# Patient Record
Sex: MALE | Race: BLACK OR AFRICAN AMERICAN | Employment: FULL TIME | URBAN - METROPOLITAN AREA
[De-identification: names, ages, dates, MRNs, and addresses within clinical notes are randomized per-mention and may not be internally consistent; named-entity substitution may affect disease eponyms.]

---

## 2017-05-26 ENCOUNTER — APPOINTMENT (OUTPATIENT)
Dept: GENERAL RADIOLOGY | Age: 37
End: 2017-05-26

## 2017-05-26 ENCOUNTER — HOSPITAL ENCOUNTER (EMERGENCY)
Age: 37
Discharge: HOME | End: 2017-05-26
Attending: EMERGENCY MEDICINE

## 2017-05-26 VITALS
TEMPERATURE: 97.2 F | SYSTOLIC BLOOD PRESSURE: 146 MMHG | DIASTOLIC BLOOD PRESSURE: 96 MMHG | WEIGHT: 145 LBS | HEART RATE: 79 BPM | OXYGEN SATURATION: 98 % | BODY MASS INDEX: 22.76 KG/M2 | RESPIRATION RATE: 18 BRPM | HEIGHT: 67 IN

## 2017-05-26 DIAGNOSIS — S09.90XA CLOSED HEAD INJURY, INITIAL ENCOUNTER: ICD-10-CM

## 2017-05-26 DIAGNOSIS — S20.219A CONTUSION OF CHEST WALL, UNSPECIFIED LATERALITY, INITIAL ENCOUNTER: ICD-10-CM
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
## 2017-05-26 PROCEDURE — 71020 XR CHEST STANDARD TWO VW: CPT

## 2017-05-26 PROCEDURE — 99284 EMERGENCY DEPT VISIT MOD MDM: CPT

## 2017-05-26 ASSESSMENT — PAIN SCALES - GENERAL: PAINLEVEL_OUTOF10: 7

## 2017-05-26 ASSESSMENT — PAIN DESCRIPTION - LOCATION: LOCATION: HEAD

## 2017-05-26 ASSESSMENT — PAIN DESCRIPTION - PAIN TYPE: TYPE: ACUTE PAIN

## 2017-05-27 ASSESSMENT — ENCOUNTER SYMPTOMS
TROUBLE SWALLOWING: 0
BACK PAIN: 0
NAUSEA: 0
SHORTNESS OF BREATH: 0
RHINORRHEA: 0
DIARRHEA: 0
CONSTIPATION: 0
ABDOMINAL PAIN: 0
VOMITING: 0

## 2019-10-28 ENCOUNTER — OFFICE VISIT (OUTPATIENT)
Dept: FAMILY MEDICINE CLINIC | Age: 39
End: 2019-10-28
Payer: MEDICAID

## 2019-10-28 VITALS
OXYGEN SATURATION: 97 % | HEART RATE: 94 BPM | WEIGHT: 171 LBS | BODY MASS INDEX: 25.91 KG/M2 | TEMPERATURE: 98.1 F | DIASTOLIC BLOOD PRESSURE: 75 MMHG | HEIGHT: 68 IN | RESPIRATION RATE: 22 BRPM | SYSTOLIC BLOOD PRESSURE: 132 MMHG

## 2019-10-28 DIAGNOSIS — L65.9 HAIR LOSS: ICD-10-CM

## 2019-10-28 DIAGNOSIS — L85.3 DRY SKIN: ICD-10-CM

## 2019-10-28 DIAGNOSIS — Z76.89 ESTABLISHING CARE WITH NEW DOCTOR, ENCOUNTER FOR: Primary | ICD-10-CM

## 2019-10-28 PROCEDURE — 99203 OFFICE O/P NEW LOW 30 MIN: CPT | Performed by: FAMILY MEDICINE

## 2019-10-28 SDOH — HEALTH STABILITY: MENTAL HEALTH: HOW OFTEN DO YOU HAVE A DRINK CONTAINING ALCOHOL?: NEVER

## 2019-10-28 ASSESSMENT — PATIENT HEALTH QUESTIONNAIRE - PHQ9
SUM OF ALL RESPONSES TO PHQ9 QUESTIONS 1 & 2: 0
1. LITTLE INTEREST OR PLEASURE IN DOING THINGS: 0
SUM OF ALL RESPONSES TO PHQ QUESTIONS 1-9: 0
2. FEELING DOWN, DEPRESSED OR HOPELESS: 0
SUM OF ALL RESPONSES TO PHQ QUESTIONS 1-9: 0

## 2019-10-29 ENCOUNTER — HOSPITAL ENCOUNTER (OUTPATIENT)
Age: 39
Setting detail: SPECIMEN
Discharge: HOME | End: 2019-10-29

## 2019-10-29 DIAGNOSIS — L85.3 DRY SKIN: ICD-10-CM

## 2019-10-29 DIAGNOSIS — L65.9 HAIR LOSS: ICD-10-CM

## 2019-10-29 DIAGNOSIS — Z76.89 ESTABLISHING CARE WITH NEW DOCTOR, ENCOUNTER FOR: ICD-10-CM

## 2019-10-29 LAB
ABSOLUTE EOS #: 0.09 K/UL (ref 0–0.44)
ABSOLUTE IMMATURE GRANULOCYTE: <0.03 K/UL (ref 0–0.3)
ABSOLUTE LYMPH #: 2.44 K/UL (ref 1.1–3.7)
ABSOLUTE MONO #: 0.38 K/UL (ref 0.1–1.2)
ALBUMIN SERPL-MCNC: 4.6 G/DL (ref 3.5–5.2)
ALBUMIN/GLOBULIN RATIO: 1.2 (ref 1–2.5)
ALP BLD-CCNC: 99 U/L (ref 40–129)
ALT SERPL-CCNC: 77 U/L (ref 5–41)
ANION GAP SERPL CALCULATED.3IONS-SCNC: 16 MMOL/L (ref 9–17)
AST SERPL-CCNC: 49 U/L
BASOPHILS # BLD: 0 % (ref 0–2)
BASOPHILS ABSOLUTE: <0.03 K/UL (ref 0–0.2)
BILIRUB SERPL-MCNC: 1.05 MG/DL (ref 0.3–1.2)
BUN BLDV-MCNC: 12 MG/DL (ref 6–20)
BUN/CREAT BLD: ABNORMAL (ref 9–20)
CALCIUM SERPL-MCNC: 10 MG/DL (ref 8.6–10.4)
CHLORIDE BLD-SCNC: 104 MMOL/L (ref 98–107)
CHOLESTEROL/HDL RATIO: 3.6
CHOLESTEROL: 182 MG/DL
CO2: 24 MMOL/L (ref 20–31)
CREAT SERPL-MCNC: 0.83 MG/DL (ref 0.7–1.2)
DIFFERENTIAL TYPE: ABNORMAL
EOSINOPHILS RELATIVE PERCENT: 2 % (ref 1–4)
GFR AFRICAN AMERICAN: >60 ML/MIN
GFR NON-AFRICAN AMERICAN: >60 ML/MIN
GFR SERPL CREATININE-BSD FRML MDRD: ABNORMAL ML/MIN/{1.73_M2}
GFR SERPL CREATININE-BSD FRML MDRD: ABNORMAL ML/MIN/{1.73_M2}
GLUCOSE BLD-MCNC: 94 MG/DL (ref 70–99)
HCT VFR BLD CALC: 51.1 % (ref 40.7–50.3)
HDLC SERPL-MCNC: 51 MG/DL
HEMOGLOBIN: 16.5 G/DL (ref 13–17)
IMMATURE GRANULOCYTES: 0 %
LDL CHOLESTEROL: 121 MG/DL (ref 0–130)
LYMPHOCYTES # BLD: 44 % (ref 24–43)
MCH RBC QN AUTO: 29.8 PG (ref 25.2–33.5)
MCHC RBC AUTO-ENTMCNC: 32.3 G/DL (ref 28.4–34.8)
MCV RBC AUTO: 92.4 FL (ref 82.6–102.9)
MONOCYTES # BLD: 7 % (ref 3–12)
NRBC AUTOMATED: 0 PER 100 WBC
PDW BLD-RTO: 13 % (ref 11.8–14.4)
PLATELET # BLD: 203 K/UL (ref 138–453)
PLATELET ESTIMATE: ABNORMAL
PMV BLD AUTO: 12.2 FL (ref 8.1–13.5)
POTASSIUM SERPL-SCNC: 4.8 MMOL/L (ref 3.7–5.3)
RBC # BLD: 5.53 M/UL (ref 4.21–5.77)
RBC # BLD: ABNORMAL 10*6/UL
SEG NEUTROPHILS: 47 % (ref 36–65)
SEGMENTED NEUTROPHILS ABSOLUTE COUNT: 2.55 K/UL (ref 1.5–8.1)
SODIUM BLD-SCNC: 144 MMOL/L (ref 135–144)
THYROXINE, FREE: 1.09 NG/DL (ref 0.93–1.7)
TOTAL PROTEIN: 8.4 G/DL (ref 6.4–8.3)
TRIGL SERPL-MCNC: 52 MG/DL
TSH SERPL DL<=0.05 MIU/L-ACNC: 2.52 MIU/L (ref 0.3–5)
VLDLC SERPL CALC-MCNC: NORMAL MG/DL (ref 1–30)
WBC # BLD: 5.5 K/UL (ref 3.5–11.3)
WBC # BLD: ABNORMAL 10*3/UL

## 2019-10-30 LAB — THYROID PEROXIDASE (TPO) AB: <10 IU/ML (ref 0–35)

## 2019-11-01 DIAGNOSIS — R74.8 ELEVATED LIVER ENZYMES: ICD-10-CM

## 2019-11-01 DIAGNOSIS — E78.00 HYPERCHOLESTEREMIA: Primary | ICD-10-CM

## 2019-11-04 ENCOUNTER — HOSPITAL ENCOUNTER (OUTPATIENT)
Age: 39
Setting detail: SPECIMEN
Discharge: HOME | End: 2019-11-04
Payer: MEDICARE

## 2019-11-04 ENCOUNTER — OFFICE VISIT (OUTPATIENT)
Dept: FAMILY MEDICINE CLINIC | Age: 39
End: 2019-11-04
Payer: MEDICARE

## 2019-11-04 VITALS
DIASTOLIC BLOOD PRESSURE: 78 MMHG | OXYGEN SATURATION: 100 % | SYSTOLIC BLOOD PRESSURE: 127 MMHG | HEART RATE: 74 BPM | WEIGHT: 172.8 LBS | BODY MASS INDEX: 26.19 KG/M2 | HEIGHT: 68 IN

## 2019-11-04 DIAGNOSIS — L65.9 HAIR LOSS: ICD-10-CM

## 2019-11-04 DIAGNOSIS — R74.8 ELEVATED LIVER ENZYMES: Primary | ICD-10-CM

## 2019-11-04 DIAGNOSIS — R74.8 ELEVATED LIVER ENZYMES: ICD-10-CM

## 2019-11-04 LAB
ALBUMIN SERPL-MCNC: 4.5 G/DL (ref 3.5–5.2)
ALBUMIN/GLOBULIN RATIO: 1.3 (ref 1–2.5)
ALP BLD-CCNC: 97 U/L (ref 40–129)
ALT SERPL-CCNC: 69 U/L (ref 5–41)
AST SERPL-CCNC: 48 U/L
BILIRUB SERPL-MCNC: 0.9 MG/DL (ref 0.3–1.2)
BILIRUBIN DIRECT: 0.15 MG/DL
BILIRUBIN, INDIRECT: 0.75 MG/DL (ref 0–1)
GLOBULIN: ABNORMAL G/DL (ref 1.5–3.8)
HBV SURFACE AB TITR SER: <3.5 MIU/ML
HEPATITIS C ANTIBODY: NONREACTIVE
TOTAL PROTEIN: 8 G/DL (ref 6.4–8.3)

## 2019-11-04 PROCEDURE — 99213 OFFICE O/P EST LOW 20 MIN: CPT | Performed by: FAMILY MEDICINE

## 2019-12-10 ENCOUNTER — OFFICE VISIT (OUTPATIENT)
Dept: DERMATOLOGY | Age: 39
End: 2019-12-10
Payer: MEDICARE

## 2019-12-10 VITALS
SYSTOLIC BLOOD PRESSURE: 147 MMHG | BODY MASS INDEX: 25.64 KG/M2 | HEIGHT: 68 IN | HEART RATE: 79 BPM | OXYGEN SATURATION: 97 % | DIASTOLIC BLOOD PRESSURE: 88 MMHG | WEIGHT: 169.2 LBS

## 2019-12-10 DIAGNOSIS — L65.9 ALOPECIA: Primary | ICD-10-CM

## 2019-12-10 DIAGNOSIS — L81.9 DYSPIGMENTATION: ICD-10-CM

## 2019-12-10 PROCEDURE — G8484 FLU IMMUNIZE NO ADMIN: HCPCS | Performed by: DERMATOLOGY

## 2019-12-10 PROCEDURE — G8427 DOCREV CUR MEDS BY ELIG CLIN: HCPCS | Performed by: DERMATOLOGY

## 2019-12-10 PROCEDURE — 1036F TOBACCO NON-USER: CPT | Performed by: DERMATOLOGY

## 2019-12-10 PROCEDURE — 99202 OFFICE O/P NEW SF 15 MIN: CPT | Performed by: DERMATOLOGY

## 2019-12-10 PROCEDURE — G8419 CALC BMI OUT NRM PARAM NOF/U: HCPCS | Performed by: DERMATOLOGY

## 2019-12-10 RX ORDER — FAMOTIDINE 20 MG/1
20 TABLET, FILM COATED ORAL DAILY
COMMUNITY
End: 2020-02-10

## 2019-12-10 RX ORDER — TRIAMCINOLONE ACETONIDE 0.25 MG/G
CREAM TOPICAL
Qty: 15 G | Refills: 2 | Status: SHIPPED | OUTPATIENT
Start: 2019-12-10 | End: 2021-01-21 | Stop reason: ALTCHOICE

## 2019-12-10 RX ORDER — BETAMETHASONE DIPROPIONATE 0.05 %
OINTMENT (GRAM) TOPICAL
Qty: 15 G | Refills: 2 | Status: SHIPPED | OUTPATIENT
Start: 2019-12-10 | End: 2020-02-10

## 2020-02-10 ENCOUNTER — OFFICE VISIT (OUTPATIENT)
Dept: DERMATOLOGY | Age: 40
End: 2020-02-10
Payer: MEDICARE

## 2020-02-10 ENCOUNTER — HOSPITAL ENCOUNTER (OUTPATIENT)
Age: 40
Setting detail: SPECIMEN
Discharge: HOME | End: 2020-02-10
Payer: MEDICARE

## 2020-02-10 VITALS
OXYGEN SATURATION: 100 % | WEIGHT: 165.8 LBS | HEIGHT: 68 IN | HEART RATE: 78 BPM | BODY MASS INDEX: 25.13 KG/M2 | DIASTOLIC BLOOD PRESSURE: 76 MMHG | SYSTOLIC BLOOD PRESSURE: 128 MMHG

## 2020-02-10 PROCEDURE — 11104 PUNCH BX SKIN SINGLE LESION: CPT | Performed by: DERMATOLOGY

## 2020-02-10 PROCEDURE — 11105 PUNCH BX SKIN EA SEP/ADDL: CPT | Performed by: DERMATOLOGY

## 2020-02-10 RX ORDER — LIDOCAINE HYDROCHLORIDE AND EPINEPHRINE 10; 10 MG/ML; UG/ML
1 INJECTION, SOLUTION INFILTRATION; PERINEURAL ONCE
Status: COMPLETED | OUTPATIENT
Start: 2020-02-10 | End: 2020-02-10

## 2020-02-10 RX ADMIN — LIDOCAINE HYDROCHLORIDE AND EPINEPHRINE 1 ML: 10; 10 INJECTION, SOLUTION INFILTRATION; PERINEURAL at 20:42

## 2020-02-10 NOTE — PROGRESS NOTES
Patient presents for f/u hair loss and skin discoloration. Triamcinolone helped hair regrow in eyebrows, but otherwise no effect. He just stopped using the creams but used consistently since LV. No itch. He has started to note loss of hair on his arms along with discoloration. On exam, thinning on mid frontal scalp, hyperpigmented patch photodistributed on arms and forearms. Biopsy today of right arm and right frontal scalp. R/o lichenoid (lichen planopilaris and pigmentosus) vs. PIH vs. Androgenetic alopecia    Dermatology Procedure Note   [facility] PHYSICIANS  [facility] HE[facility] DERMATOLOGY  [address] [address]  Dept: [phone]  Dept Fax: [phone]      Procedure Date: 2/10/2020  Procedure Time: 8:41 PM    Procedure Practitioner: Mercedes Aragon MD    Procedure: Skin Biopsy    Pre-Procedure Diagnosis: Alopecia and hyperpigmentation    Post-Procedure Diagnosis: Same as Pre-Procedure Diagnosis    Informed Consent: The procedure and its risks were explained including but not limited to pain, bleeding, infection, permanent scar, permanent pigment alteration and recurrence. Consent to proceed with the procedure was obtained from the patient or the parent by the practitioner    Time Out:  A time out was conducted immediately before starting the procedure that confirmed a final verification of the correct patient, correct procedure, and correct site. Procedure Details:  Punch Biopsy x 2: The procedure and its risks were explained including but not limited to pain, bleeding, infection, permanent scar, permanent pigment alteration and need for an additional procedure. Consent to proceed with the procedure was obtained from the patient or the parent. After cleaning with alcohol the rash was anesthetized with 1% lidocaine with epinephrine and two 4 mm punches were performed on the scalp and arm.  Hemostasis was achieved with suture closure of the defects with 4-0 Ethilon and Vaseline and a bandage were applied.     Procedure Performed By: Jett Perez MD    Estimated Blood Loss: Minimal    Pathologic Specimen: H&E    Procedure Tolerance: Good    Complication(s): None    Electronically signed by Jett Perez MD on 2/10/20 at 8:41 PM

## 2020-02-13 LAB — DERMATOLOGY PATHOLOGY REPORT: NORMAL

## 2020-02-15 ENCOUNTER — TELEPHONE (OUTPATIENT)
Dept: DERMATOLOGY | Age: 40
End: 2020-02-15

## 2020-02-15 NOTE — TELEPHONE ENCOUNTER
Called patient with results. Male pattern balding of scalp and physiologic pigmentation of skin. He was worried about cancer causing these skin changes, I reassured him that I did not think this was the case. I recommended Rogaine for hair and sun protection to reduce his perceived hyperpigmentation. He will call for f/u if needed.

## 2020-02-20 ENCOUNTER — NURSE ONLY (OUTPATIENT)
Dept: DERMATOLOGY | Age: 40
End: 2020-02-20

## 2020-02-20 PROCEDURE — 99024 POSTOP FOLLOW-UP VISIT: CPT | Performed by: DERMATOLOGY

## 2020-02-20 NOTE — PROGRESS NOTES
Anisha Mancini presented for suture removal on the Rt arm and scalp. The biopsy site was well healed. The suture was removed and a band-aid applied. The patient left in good condition.

## 2020-07-13 ENCOUNTER — TELEPHONE (OUTPATIENT)
Dept: DERMATOLOGY | Age: 40
End: 2020-07-13

## 2020-07-13 RX ORDER — BETAMETHASONE DIPROPIONATE 0.05 %
OINTMENT (GRAM) TOPICAL
Qty: 50 G | Refills: 0 | Status: SHIPPED | OUTPATIENT
Start: 2020-07-13 | End: 2021-01-21 | Stop reason: ALTCHOICE

## 2020-07-13 NOTE — TELEPHONE ENCOUNTER
Future Appointments   Date Time Provider Олег Dalton   9/14/2020 10:45 AM Ana M Mae MD  derm [facility]

## 2020-11-09 ENCOUNTER — OFFICE VISIT (OUTPATIENT)
Dept: DERMATOLOGY | Age: 40
End: 2020-11-09
Payer: COMMERCIAL

## 2020-11-09 VITALS
TEMPERATURE: 97.9 F | HEART RATE: 92 BPM | BODY MASS INDEX: 26.81 KG/M2 | SYSTOLIC BLOOD PRESSURE: 148 MMHG | DIASTOLIC BLOOD PRESSURE: 72 MMHG | WEIGHT: 166.8 LBS | HEIGHT: 66 IN | OXYGEN SATURATION: 96 %

## 2020-11-09 PROCEDURE — G8484 FLU IMMUNIZE NO ADMIN: HCPCS | Performed by: DERMATOLOGY

## 2020-11-09 PROCEDURE — 99213 OFFICE O/P EST LOW 20 MIN: CPT | Performed by: DERMATOLOGY

## 2020-11-09 PROCEDURE — G8419 CALC BMI OUT NRM PARAM NOF/U: HCPCS | Performed by: DERMATOLOGY

## 2020-11-09 PROCEDURE — G8427 DOCREV CUR MEDS BY ELIG CLIN: HCPCS | Performed by: DERMATOLOGY

## 2020-11-09 PROCEDURE — 1036F TOBACCO NON-USER: CPT | Performed by: DERMATOLOGY

## 2020-11-09 NOTE — PROGRESS NOTES
Dermatology Patient Note  [facility]  [address]  [address] [address]  Dept: [phone]  Dept Fax: [phone]      VISITDATE: 11/9/2020   REFERRING PROVIDER: No ref. provider found      Mela Pascal is a 45 y.o. male  who presents today in the office for:    Alopecia (Pt stopped is topicals. He states that he is not using anything else. 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 He did not start rogaine as he isn't bothered cosmetically by the hair loss  Current treatment and adherence: none  Prior treatments tried: see initial HPI      CURRENT MEDICATIONS:   Current Outpatient Medications   Medication Sig Dispense Refill    betamethasone dipropionate (DIPROLENE) 0.05 % ointment Apply topically daily. (Patient not taking: Reported on 11/9/2020) 50 g 0    triamcinolone (KENALOG) 0.025 % cream Apply to eyebrows twice daily (Patient not taking: Reported on 11/9/2020) 15 g 2     No current facility-administered medications for this visit. ALLERGIES:   No Known Allergies    SOCIAL HISTORY:  Social History     Tobacco Use    Smoking status: Never Smoker    Smokeless tobacco: Never Used   Substance Use Topics    Alcohol use: Never     Frequency: Never       REVIEW OF SYSTEMS:  Review of Systems  Skin: Denies any new changing, growing orbleeding lesions or rashes except as described in the HPI   Constitutional: Denies fevers, chills, and malaise.     PHYSICAL EXAM:   BP (!) 148/72 (Site: Left Upper Arm, Position: Sitting, Cuff Size: Medium Adult)   Pulse 92   Temp 97.9 °F (36.6 °C) (Temporal)   Ht 5' 6\" (1.676 m)   Wt 166 lb 12.8 oz (75.7 kg)   SpO2 96%   BMI 26.92 kg/m²     General Exam:  General Appearance: No acute distress, Well buy Minoxidil is [facility]   o ~$50 = 6 month supply         Follow-up: No follow-ups on file. This note was created with the assistance of a speech-recognition program.  Although the intention is to generate a document that actually reflects the content of the visit, no guarantees can be provided that every mistake has been identified and corrected byediting.     Electronically signed by Axel Zendejas MD on 11/9/20 at 11:57 AM EST

## 2020-11-09 NOTE — PATIENT INSTRUCTIONS
- Follow up as needed  - Rogaine if desired    How To Use Minoxidil (Rogaine)   Use mens strength: 5%    Once or twice/day  o Much better to use regularly once a day than inconsistently every other day    Only apply medication where you actually want your hair to grow  o Can cause hair to grow on face, neck, hands, etc. if not washed off  o Tips for application: wear gloves or wash hands with soap and water immediately      Important things to know:  o Need to use for ~8-12 months to see effects  o Medication must be used consistently and continuously for a sustained benefit   o May notice some hair loss when starting medication   - This is due to the medication converting the hair from a weaker hair into a stronger hair  o Will notice hair loss when medication is stopped   - Will lose only newly grown hair gained from using medicine  - Will NOT speed up hair loss, normal loss starts again     Cheapest place to buy Minoxidil is [facility]   o ~$50 = 6 month supply

## 2021-01-21 ENCOUNTER — OFFICE VISIT (OUTPATIENT)
Dept: DERMATOLOGY | Age: 41
End: 2021-01-21
Payer: COMMERCIAL

## 2021-01-21 ENCOUNTER — HOSPITAL ENCOUNTER (OUTPATIENT)
Age: 41
Discharge: HOME | End: 2021-01-21
Payer: COMMERCIAL

## 2021-01-21 VITALS
WEIGHT: 166 LBS | BODY MASS INDEX: 26.68 KG/M2 | OXYGEN SATURATION: 99 % | DIASTOLIC BLOOD PRESSURE: 83 MMHG | SYSTOLIC BLOOD PRESSURE: 147 MMHG | HEART RATE: 91 BPM | HEIGHT: 66 IN | TEMPERATURE: 96.4 F

## 2021-01-21 DIAGNOSIS — L60.4 BEAU'S LINES: ICD-10-CM

## 2021-01-21 DIAGNOSIS — L60.8 LONGITUDINAL MELANONYCHIA: ICD-10-CM

## 2021-01-21 DIAGNOSIS — L60.4 BEAU'S LINES: Primary | ICD-10-CM

## 2021-01-21 LAB
ABSOLUTE EOS #: 0.04 K/UL (ref 0–0.44)
ABSOLUTE IMMATURE GRANULOCYTE: <0.03 K/UL (ref 0–0.3)
ABSOLUTE LYMPH #: 2.48 K/UL (ref 1.1–3.7)
ABSOLUTE MONO #: 0.38 K/UL (ref 0.1–1.2)
ALBUMIN SERPL-MCNC: 4.7 G/DL (ref 3.5–5.2)
ALBUMIN/GLOBULIN RATIO: 1.4 (ref 1–2.5)
ALP BLD-CCNC: 100 U/L (ref 40–129)
ALT SERPL-CCNC: 39 U/L (ref 5–41)
ANION GAP SERPL CALCULATED.3IONS-SCNC: 17 MMOL/L (ref 9–17)
AST SERPL-CCNC: 34 U/L
BASOPHILS # BLD: 0 % (ref 0–2)
BASOPHILS ABSOLUTE: <0.03 K/UL (ref 0–0.2)
BILIRUB SERPL-MCNC: 0.83 MG/DL (ref 0.3–1.2)
BUN BLDV-MCNC: 15 MG/DL (ref 6–20)
BUN/CREAT BLD: ABNORMAL (ref 9–20)
CALCIUM SERPL-MCNC: 9.9 MG/DL (ref 8.6–10.4)
CHLORIDE BLD-SCNC: 109 MMOL/L (ref 98–107)
CO2: 23 MMOL/L (ref 20–31)
CREAT SERPL-MCNC: 1.15 MG/DL (ref 0.7–1.2)
DIFFERENTIAL TYPE: ABNORMAL
EOSINOPHILS RELATIVE PERCENT: 1 % (ref 1–4)
GFR AFRICAN AMERICAN: >60 ML/MIN
GFR NON-AFRICAN AMERICAN: >60 ML/MIN
GFR SERPL CREATININE-BSD FRML MDRD: ABNORMAL ML/MIN/{1.73_M2}
GFR SERPL CREATININE-BSD FRML MDRD: ABNORMAL ML/MIN/{1.73_M2}
GLUCOSE BLD-MCNC: 88 MG/DL (ref 70–99)
HCT VFR BLD CALC: 46.4 % (ref 40.7–50.3)
HEMOGLOBIN: 15.1 G/DL (ref 13–17)
IMMATURE GRANULOCYTES: 0 %
LYMPHOCYTES # BLD: 45 % (ref 24–43)
MCH RBC QN AUTO: 30 PG (ref 25.2–33.5)
MCHC RBC AUTO-ENTMCNC: 32.5 G/DL (ref 28.4–34.8)
MCV RBC AUTO: 92.2 FL (ref 82.6–102.9)
MONOCYTES # BLD: 7 % (ref 3–12)
NRBC AUTOMATED: 0 PER 100 WBC
PDW BLD-RTO: 12.8 % (ref 11.8–14.4)
PLATELET # BLD: 208 K/UL (ref 138–453)
PLATELET ESTIMATE: ABNORMAL
PMV BLD AUTO: 12.6 FL (ref 8.1–13.5)
POTASSIUM SERPL-SCNC: 4.2 MMOL/L (ref 3.7–5.3)
RBC # BLD: 5.03 M/UL (ref 4.21–5.77)
RBC # BLD: ABNORMAL 10*6/UL
SEG NEUTROPHILS: 47 % (ref 36–65)
SEGMENTED NEUTROPHILS ABSOLUTE COUNT: 2.62 K/UL (ref 1.5–8.1)
SODIUM BLD-SCNC: 149 MMOL/L (ref 135–144)
TOTAL PROTEIN: 8.1 G/DL (ref 6.4–8.3)
TSH SERPL DL<=0.05 MIU/L-ACNC: 2 MIU/L (ref 0.3–5)
WBC # BLD: 5.6 K/UL (ref 3.5–11.3)
WBC # BLD: ABNORMAL 10*3/UL

## 2021-01-21 PROCEDURE — 85025 COMPLETE CBC W/AUTO DIFF WBC: CPT

## 2021-01-21 PROCEDURE — 84443 ASSAY THYROID STIM HORMONE: CPT

## 2021-01-21 PROCEDURE — 36415 COLL VENOUS BLD VENIPUNCTURE: CPT

## 2021-01-21 PROCEDURE — 99213 OFFICE O/P EST LOW 20 MIN: CPT | Performed by: DERMATOLOGY

## 2021-01-21 PROCEDURE — G8427 DOCREV CUR MEDS BY ELIG CLIN: HCPCS | Performed by: DERMATOLOGY

## 2021-01-21 PROCEDURE — G8419 CALC BMI OUT NRM PARAM NOF/U: HCPCS | Performed by: DERMATOLOGY

## 2021-01-21 PROCEDURE — 80053 COMPREHEN METABOLIC PANEL: CPT

## 2021-01-21 PROCEDURE — G8484 FLU IMMUNIZE NO ADMIN: HCPCS | Performed by: DERMATOLOGY

## 2021-01-21 PROCEDURE — 1036F TOBACCO NON-USER: CPT | Performed by: DERMATOLOGY

## 2021-01-21 RX ORDER — FLUOCINONIDE 0.5 MG/G
OINTMENT TOPICAL
Qty: 60 G | Refills: 2 | Status: SHIPPED | OUTPATIENT
Start: 2021-01-21

## 2021-01-21 NOTE — PROGRESS NOTES
Dermatology Patient Note  [facility]  [address]  [address]  Dept: [phone]  Dept Fax: [phone]      VISITDATE: 1/21/2021   REFERRING PROVIDER: No ref. provider found      Nilesh Beavers is a 45 y.o. male  who presents today in the office for:    Follow-up (discoloration of nails and hand and feet for the past few weeks)      PERTINENT HISTORY NOT LISTED ABOVE:  Patient presents for discoloration and ridges in nails. He has noticed it for the past few weeks. The nails seem to break off more and more proximally    CURRENT MEDICATIONS:   Current Outpatient Medications   Medication Sig Dispense Refill    fluocinonide (LIDEX) 0.05 % ointment Apply to base of nails daily 60 g 2     No current facility-administered medications for this visit. ALLERGIES:   No Known Allergies    SOCIAL HISTORY:  Social History     Tobacco Use    Smoking status: Never Smoker    Smokeless tobacco: Never Used   Substance Use Topics    Alcohol use: Never     Frequency: Never       Pertinent ROS:  Review of Systems  Skin: Denies any new changing, growing or bleeding lesions or rashes except as described in the HPI   Constitutional: Denies fevers, chills, and malaise. PHYSICAL EXAM:   BP (!) 147/83   Pulse 91   Temp 96.4 °F (35.8 °C)   Ht 5' 6\" (1.676 m)   Wt 166 lb (75.3 kg)   SpO2 99%   BMI 26.79 kg/m²     The patient is generally well appearing, well nourished, alert and conversational. Affect is normal.    Cutaneous Exam:  Physical Exam  Sun-exposed skin, which includes the head/face, neck, both arms, digits and/or nails was examined. Diagnoses/exam findings/medical history pertinent to this visit are listed below:    Assessment and Plan:  Assessment   1. Beau's lines/transverse ridging of nails  - denies trauma to nails or itching/pain. No recent illnesses, no meds  - TSH with Reflex; Future  - Comprehensive Metabolic Panel;  Future

## 2021-01-21 NOTE — PATIENT INSTRUCTIONS
- Lidex ointment twice daily to cuticles on hands and feet  - Labs today      *Melanin is deposited into the growing nail when melanocytes are activated, resulting in a pigmented band  this is longitudinal melanonychia. The deposition of melanin in the nail plate can result from 2 processes: Melanocytic hyperplasia. Melanocytic activation.

## 2021-01-22 ENCOUNTER — TELEPHONE (OUTPATIENT)
Dept: DERMATOLOGY | Age: 41
End: 2021-01-22

## 2021-01-22 NOTE — TELEPHONE ENCOUNTER
Please inform patient that his labs are mostly normal. His sodium is slightly elevated which could indicate he's a little dehydrated. Nothing I would worry about, just make sure to drink plenty of fluids. Nothing explains the ridging in his nails. Try the topical ointment and let me know if the condition does not improve or worsens.

## 2021-01-25 NOTE — TELEPHONE ENCOUNTER
Pt informed, no questions at this time.  Asked him to call back in a few weeks with an update and to use the ointment as prescribed

## 2021-06-14 ENCOUNTER — HOSPITAL ENCOUNTER (EMERGENCY)
Age: 41
Discharge: HOME | End: 2021-06-14
Attending: EMERGENCY MEDICINE
Payer: COMMERCIAL

## 2021-06-14 VITALS
TEMPERATURE: 98.8 F | OXYGEN SATURATION: 99 % | SYSTOLIC BLOOD PRESSURE: 148 MMHG | WEIGHT: 173 LBS | HEIGHT: 67 IN | HEART RATE: 73 BPM | DIASTOLIC BLOOD PRESSURE: 70 MMHG | BODY MASS INDEX: 27.15 KG/M2 | RESPIRATION RATE: 16 BRPM

## 2021-06-14 DIAGNOSIS — R10.13 ABDOMINAL PAIN, EPIGASTRIC: Primary | ICD-10-CM

## 2021-06-14 LAB
ABSOLUTE EOS #: 0.1 K/UL (ref 0–0.44)
ABSOLUTE IMMATURE GRANULOCYTE: 0.02 K/UL (ref 0–0.3)
ABSOLUTE LYMPH #: 2.52 K/UL (ref 1.1–3.7)
ABSOLUTE MONO #: 0.41 K/UL (ref 0.1–1.2)
ALBUMIN SERPL-MCNC: 4.6 G/DL (ref 3.5–5.2)
ALBUMIN/GLOBULIN RATIO: NORMAL (ref 1–2.5)
ALP BLD-CCNC: 108 U/L (ref 40–129)
ALT SERPL-CCNC: 30 U/L (ref 5–41)
ANION GAP SERPL CALCULATED.3IONS-SCNC: 11 MMOL/L (ref 9–17)
AST SERPL-CCNC: 24 U/L
BASOPHILS # BLD: 1 % (ref 0–2)
BASOPHILS ABSOLUTE: 0.03 K/UL (ref 0–0.2)
BILIRUB SERPL-MCNC: 0.5 MG/DL (ref 0.3–1.2)
BILIRUBIN DIRECT: 0.11 MG/DL
BILIRUBIN, INDIRECT: 0.39 MG/DL (ref 0–1)
BUN BLDV-MCNC: 11 MG/DL (ref 6–20)
BUN/CREAT BLD: 10 (ref 9–20)
CALCIUM SERPL-MCNC: 9.5 MG/DL (ref 8.6–10.4)
CHLORIDE BLD-SCNC: 103 MMOL/L (ref 98–107)
CO2: 25 MMOL/L (ref 20–31)
CREAT SERPL-MCNC: 1.05 MG/DL (ref 0.7–1.2)
DIFFERENTIAL TYPE: NORMAL
EOSINOPHILS RELATIVE PERCENT: 2 % (ref 1–4)
GFR AFRICAN AMERICAN: >60 ML/MIN
GFR NON-AFRICAN AMERICAN: >60 ML/MIN
GFR SERPL CREATININE-BSD FRML MDRD: ABNORMAL ML/MIN/{1.73_M2}
GFR SERPL CREATININE-BSD FRML MDRD: ABNORMAL ML/MIN/{1.73_M2}
GLOBULIN: NORMAL G/DL (ref 1.5–3.8)
GLUCOSE BLD-MCNC: 105 MG/DL (ref 70–99)
HCT VFR BLD CALC: 47.5 % (ref 40.7–50.3)
HEMOGLOBIN: 15.1 G/DL (ref 13–17)
IMMATURE GRANULOCYTES: 0 %
LIPASE: 29 U/L (ref 13–60)
LYMPHOCYTES # BLD: 42 % (ref 24–43)
MCH RBC QN AUTO: 30.1 PG (ref 25.2–33.5)
MCHC RBC AUTO-ENTMCNC: 31.8 G/DL (ref 28.4–34.8)
MCV RBC AUTO: 94.6 FL (ref 82.6–102.9)
MONOCYTES # BLD: 7 % (ref 3–12)
NRBC AUTOMATED: 0 PER 100 WBC
PDW BLD-RTO: 12.6 % (ref 11.8–14.4)
PLATELET # BLD: 180 K/UL (ref 138–453)
PLATELET ESTIMATE: NORMAL
PMV BLD AUTO: 11.8 FL (ref 8.1–13.5)
POTASSIUM SERPL-SCNC: 4 MMOL/L (ref 3.7–5.3)
RBC # BLD: 5.02 M/UL (ref 4.21–5.77)
RBC # BLD: NORMAL 10*6/UL
SEG NEUTROPHILS: 48 % (ref 36–65)
SEGMENTED NEUTROPHILS ABSOLUTE COUNT: 2.87 K/UL (ref 1.5–8.1)
SODIUM BLD-SCNC: 139 MMOL/L (ref 135–144)
TOTAL PROTEIN: 8 G/DL (ref 6.4–8.3)
WBC # BLD: 6 K/UL (ref 3.5–11.3)
WBC # BLD: NORMAL 10*3/UL

## 2021-06-14 PROCEDURE — 80048 BASIC METABOLIC PNL TOTAL CA: CPT

## 2021-06-14 PROCEDURE — 99283 EMERGENCY DEPT VISIT LOW MDM: CPT

## 2021-06-14 PROCEDURE — 83690 ASSAY OF LIPASE: CPT

## 2021-06-14 PROCEDURE — 85025 COMPLETE CBC W/AUTO DIFF WBC: CPT

## 2021-06-14 PROCEDURE — 80076 HEPATIC FUNCTION PANEL: CPT

## 2021-06-14 PROCEDURE — 6370000000 HC RX 637 (ALT 250 FOR IP): Performed by: NURSE PRACTITIONER

## 2021-06-14 RX ORDER — PANTOPRAZOLE SODIUM 40 MG/1
40 TABLET, DELAYED RELEASE ORAL ONCE
Status: COMPLETED | OUTPATIENT
Start: 2021-06-14 | End: 2021-06-14

## 2021-06-14 RX ORDER — ONDANSETRON 4 MG/1
4 TABLET, ORALLY DISINTEGRATING ORAL ONCE
Status: COMPLETED | OUTPATIENT
Start: 2021-06-14 | End: 2021-06-14

## 2021-06-14 RX ADMIN — ONDANSETRON 4 MG: 4 TABLET, ORALLY DISINTEGRATING ORAL at 17:16

## 2021-06-14 RX ADMIN — PANTOPRAZOLE SODIUM 40 MG: 40 TABLET, DELAYED RELEASE ORAL at 17:16

## 2021-06-14 RX ADMIN — LIDOCAINE HYDROCHLORIDE: 20 SOLUTION ORAL; TOPICAL at 17:16

## 2021-06-14 ASSESSMENT — ENCOUNTER SYMPTOMS
COLOR CHANGE: 0
SORE THROAT: 0
SHORTNESS OF BREATH: 0
NAUSEA: 1
VOMITING: 1
DIARRHEA: 0
ABDOMINAL PAIN: 1
BACK PAIN: 0
COUGH: 0

## 2021-06-14 ASSESSMENT — PAIN DESCRIPTION - PAIN TYPE: TYPE: CHRONIC PAIN

## 2021-06-14 ASSESSMENT — PAIN DESCRIPTION - LOCATION: LOCATION: ABDOMEN

## 2021-06-14 NOTE — ED PROVIDER NOTES
The patient was seen and examined by me in conjunction with the mid-level provider. I agree with his/her assessment and treatment plan. Work-up here is negative and the patient will follow-up promptly with his PCP.      Lev Crowe MD  06/14/21 [id number]

## 2021-06-14 NOTE — ED PROVIDER NOTES
wound.   Neurological: Negative for dizziness, weakness and headaches. Except as noted above the remainder of the review of systems was reviewed and negative. PHYSICAL EXAM    (up to 7 for level 4, 8 or more for level 5)     ED Triage Vitals   BP Temp Temp Source Pulse Resp SpO2 Height Weight   06/14/21 1438 06/14/21 1438 06/14/21 1438 06/14/21 1438 06/14/21 1438 06/14/21 1438 06/14/21 1439 06/14/21 1439   (!) 148/70 98.8 °F (37.1 °C) Oral 73 16 99 % 5' 7\" (1.702 m) 173 lb (78.5 kg)     Physical Exam  Vitals reviewed. Constitutional:       General: He is not in acute distress. Appearance: He is well-developed. He is not diaphoretic. Eyes:      General: No scleral icterus. Conjunctiva/sclera: Conjunctivae normal.   Cardiovascular:      Rate and Rhythm: Normal rate and regular rhythm. Pulmonary:      Effort: Pulmonary effort is normal. No respiratory distress. Breath sounds: Normal breath sounds. No stridor. Chest:      Chest wall: No tenderness. Abdominal:      General: There is no distension. Palpations: Abdomen is soft. Tenderness: There is abdominal tenderness in the epigastric area. There is no guarding. Musculoskeletal:      Comments: Moves extremities   Skin:     General: Skin is warm and dry. Findings: No rash. Neurological:      Mental Status: He is alert and oriented to person, place, and time. Psychiatric:         Behavior: Behavior normal.         DIAGNOSTIC RESULTS     LABS:  Labs Reviewed   BASIC METABOLIC PANEL - Abnormal; Notable for the following components:       Result Value    Glucose 105 (*)     All other components within normal limits   CBC WITH AUTO DIFFERENTIAL   HEPATIC FUNCTION PANEL   LIPASE       All other labs were within normal range or not returned as of this dictation.     EMERGENCY DEPARTMENT COURSE and DIFFERENTIAL DIAGNOSIS/MDM:   Vitals:    Vitals:    06/14/21 1438 06/14/21 1439   BP: (!) 148/70    Pulse: 73    Resp: 16    Temp: 98.8 °F (37.1 °C)    TempSrc: Oral    SpO2: 99%    Weight:  173 lb (78.5 kg)   Height:  5' 7\" (1.702 m)         MEDICATIONS GIVEN IN THE ED:  Medications   ondansetron (ZOFRAN-ODT) disintegrating tablet 4 mg (4 mg Oral Given 6/14/21 1716)   pantoprazole (PROTONIX) tablet 40 mg (40 mg Oral Given 6/14/21 1716)   aluminum & magnesium hydroxide-simethicone (MAALOX) 30 mL, lidocaine viscous hcl (XYLOCAINE) 5 mL (GI COCKTAIL) ( Oral Given 6/14/21 1716)       CLINICAL DECISION MAKING:  The patient presented alert with a nontoxic appearance and was seen in conjunction with Dr. Gale Vale. Laboratory studies were unremarkable. Records show he saw GI 1/2020. A prescription was written for Nexium. Follow up with pcp and GI for further evaluation and treatment, including possible endoscopy. Return to ED if condition worsens. FINAL IMPRESSION      1.  Abdominal pain, epigastric            DISPOSITION/PLAN   DISPOSITION Decision To Discharge 06/14/2021 05:40:14 PM      PATIENT REFERRED TO:   Tania Addison MD  [facility] [address]  [phone]    Schedule an appointment as soon as possible for a visit       Colin DO Femi  [address] [place] [address]  [phone]    Schedule an appointment as soon as possible for a visit         DISCHARGE MEDICATIONS:     Discharge Medication List as of 6/14/2021  5:40 PM      START taking these medications    Details   esomeprazole (NEXIUM) 20 MG delayed release capsule Take 1 capsule by mouth every morning (before breakfast), Disp-14 capsule, R-0Print                 (Please note that portions of this note were completed with a voice recognition program.  Efforts were made to edit the dictations but occasionally words are mis-transcribed.)    ISREAL Lyman CNP, APRN - CNP  06/14/21 [id number]